# Patient Record
(demographics unavailable — no encounter records)

---

## 2024-11-03 NOTE — PHYSICAL EXAM
[NL] : moves all extremities x4, warm, well perfused x4 [FreeTextEntry7] : decreased BS left base. No r/r/w

## 2024-11-03 NOTE — HISTORY OF PRESENT ILLNESS
[de-identified] : fever [FreeTextEntry6] :  Pt with h/o fever x 1qd. Tm 102. Pt c/o abd pain. + c/c. No ear pulling. No V/D. Is eating less   Pt s/p OM, LT 1 mth ago

## 2024-11-03 NOTE — HISTORY OF PRESENT ILLNESS
[de-identified] : fever [FreeTextEntry6] :  Pt with h/o fever x 1qd. Tm 102. Pt c/o abd pain. + c/c. No ear pulling. No V/D. Is eating less   Pt s/p OM, LT 1 mth ago

## 2024-11-05 NOTE — REVIEW OF SYSTEMS
[Eye Discharge] : no eye discharge [Eye Redness] : no eye redness [Ear Tugging] : no ear tugging [Nasal Discharge] : nasal discharge [Nasal Congestion] : nasal congestion [Sore Throat] : no sore throat [Tachypnea] : not tachypneic [Wheezing] : no wheezing [Cough] : cough [Appetite Changes] : appetite changes [Intolerance to feeds] : tolerance to feeds [Diarrhea] : diarrhea [Vomiting] : no vomiting [Constipation] : no constipation [Abdominal Pain] : abdominal pain [Negative] : Genitourinary

## 2024-11-05 NOTE — DISCUSSION/SUMMARY
[FreeTextEntry1] : Anticipatory guidance and parent education given. Continue Azithromycin as prescribed for clinical pneumonia. Add probiotic daily. Increase fluid intake.  Supportive care. Follow up as needed for persistent or worsening symptoms.

## 2024-11-05 NOTE — PHYSICAL EXAM
[Acute Distress] : no acute distress [Tired appearing] : tired appearing [Mucoid Discharge] : mucoid discharge [NL] : warm, clear [FreeTextEntry4] : nasal congestion

## 2024-11-05 NOTE — HISTORY OF PRESENT ILLNESS
[de-identified] : loose BMs, stomach pain [FreeTextEntry6] : 22 month old girl BIB mother with c/o loose stool and intermittent abdominal pain since starting Azithromycin 2 days ago for clinical pneumonia. No blood or mucus in stool. No vomiting. No urinary complaints. Initially with fever at onset of sx 4 days ago, now resolved. Tolerating po but with decreased appetite. Normal sleep.

## 2024-11-05 NOTE — BEGINNING OF VISIT
[Mother] : mother
Tenzin Lazaro)  Neurosurgery  [address], [place] [address]  Phone: [phone]  Fax: [phone]  Follow Up Time:

## 2024-11-19 NOTE — HISTORY OF PRESENT ILLNESS
[de-identified] : cold sx, tugging ears [FreeTextEntry6] : 22 month old girl BIB father with c/o increased cough, congestion and URI sx over the past 1-2 days. Tugging at ears a lot and fussier with naps today. No fever. No SOB, difficulty breathing, stridor or wheeze. No v/d. No abdominal pain, sore throat or rash. No fatigue. No urinary complaints. Good po/uop/bm. Normal sleep and activity.

## 2024-11-19 NOTE — REVIEW OF SYSTEMS
[Fever] : no fever [Fussy] : fussy [Eye Discharge] : no eye discharge [Eye Redness] : no eye redness [Ear Tugging] : ear tugging [Nasal Discharge] : nasal discharge [Nasal Congestion] : nasal congestion [Sore Throat] : no sore throat [Tachypnea] : not tachypneic [Wheezing] : no wheezing [Cough] : cough [Negative] : Genitourinary

## 2024-12-14 NOTE — DISCUSSION/SUMMARY
[FreeTextEntry1] : 23 M with viral infection, given description concern for croup. Discussed with parent croup including expected natural course. Reviewed stridor and recommendation to expose child to cool air. If stridor persistent, seek care urgently. Also seek care if with increased work of breathing, rate of breathing, or other concerns.    Supportive care: cool mist humidifier, honey for cough suppression, warm liquids to break up mucus  Nasal saline for congestion Elevate head of bed Appropriate anticipatory guidance and education given; seek care if symptoms persist or worsen, signs of distress (reviewed w/parent signs of resp distress)

## 2024-12-14 NOTE — REVIEW OF SYSTEMS
[Difficulty with Sleep] : difficulty with sleep [Nasal Congestion] : nasal congestion [Cough] : cough [Negative] : Genitourinary

## 2024-12-14 NOTE — PHYSICAL EXAM
[Stridor] : no stridor [Mucoid Discharge] : no mucoid discharge [Erythematous Oropharynx] : erythematous oropharynx [Wheezing] : no wheezing [Tachypnea] : no tachypnea [Subcostal Retractions] : no subcostal retractions [Suprasternal Retractions] : no suprasternal retractions [NL] : warm, clear [FreeTextEntry7] : RR 32, no coughing or stridor during exam

## 2024-12-14 NOTE — HISTORY OF PRESENT ILLNESS
[FreeTextEntry6] : 23 M BIB mother for concerns of cough last night. Parent reports pt woke up with barky cough, questionable stridor. Gave saline with no significant improvement but tachypnea and stridor resolved. No hx of albuterol use. + Congestion. Reports tolerating PO, no emesis, normal UOP, normal bowel movements. Mother also with cold symptoms.

## 2024-12-31 NOTE — HISTORY OF PRESENT ILLNESS
[Parents] : parents [Fruit] : fruit [Vegetables] : vegetables [Meat] : meat [Cereal] : cereal [Eggs] : eggs [Finger Foods] : finger foods [Table food] : table food [Dairy] : dairy [Normal] : Normal [Sippy cup use] : Sippy cup use [Brushing teeth] : Brushing teeth [Vitamin] : Primary Fluoride Source: Vitamin [Playtime 60 min a day] : Playtime 60 min a day [Temper Tantrums] : Temper Tantrums [Toilet Training] : Toilet training [<2 hrs of screen time] : Less than 2 hrs of screen time [No] : No cigarette smoke exposure [Water heater temperature set at <120 degrees F] : Water heater temperature set at <120 degrees F [Smoke Detectors] : Smoke detectors [Car seat in back seat] : Car seat in back seat [Carbon Monoxide Detectors] : Carbon monoxide detectors [Up to date] : Up to date [At risk for exposure to TB] : Not at risk for exposure to Tuberculosis [FreeTextEntry7] : Doing well [de-identified] : None [FreeTextEntry1] : 2 year old girl here for routine PE. Doing well. No current concerns. Good po/uop/bm. Normal sleep and activity. Many words, short phrases, understands all. Jumps, climbs stairs, pretend play. Growth and development wnl. H/O eczema, under good control lately.

## 2024-12-31 NOTE — DISCUSSION/SUMMARY
[Normal Growth] : growth [Normal Development] : development [None] : No known medical problems [No Elimination Concerns] : elimination [No Feeding Concerns] : feeding [No Skin Concerns] : skin [Normal Sleep Pattern] : sleep [Assessment of Language Development] : assessment of language development [Temperament and Behavior] : temperament and behavior [Toilet Training] : toilet training [TV Viewing] : tv viewing [Safety] : safety [No Medications] : ~He/She~ is not on any medications [Parent/Guardian] : parent/guardian [] : The components of the vaccine(s) to be administered today are listed in the plan of care. The disease(s) for which the vaccine(s) are intended to prevent and the risks have been discussed with the caretaker.  The risks are also included in the appropriate vaccination information statements which have been provided to the patient's caregiver.  The caregiver has given consent to vaccinate. [FreeTextEntry1] : Anticipatory guidance and parent education given. Continue cow's milk. Continue table foods, 3 meals with 2-3 snacks per day. Incorporate water daily in a sippy cup. Brush teeth twice a day with soft toothbrush. Recommend visit to dentist. Fluoride daily. When in car, keep child in rear-facing car seats until age 2, or until  the maximum height and weight for seat is reached. Put toddler to sleep in own bed. Help toddler to maintain consistent daily routines and sleep schedule. Toilet training discussed. Ensure home is safe. Use consistent, positive discipline. Read aloud to toddler. Limit screen time to no more than 2 hours per day. CBC, Lead, Ferritin ordered. Flu, Hepatitis A vaccines given. Return in 6 months for PE.

## 2024-12-31 NOTE — PHYSICAL EXAM

## 2025-05-28 NOTE — DISCUSSION/SUMMARY
[FreeTextEntry1] : Anticipatory guidance and parent education given. Symptoms likely due to viral illness. May use Tylenol or Ibuprofen as needed for fever or discomfort. Recommend supportive care including humidified air, increased fluids, rest. Return if symptoms worsen or persist. F/U in 48h if fever persists. Parent understands and agrees with plan.

## 2025-05-28 NOTE — HISTORY OF PRESENT ILLNESS
[de-identified] : fever, ER follow up [FreeTextEntry6] : 2y5m old girl BIB father with c/o fever as high as 105 for the past 2 days. Seen in [place] ER, no work up done. Pt is doing better, temp of 101.5 today. Pt is without other sx, looks well when fever is down. No known sick contacts. No SOB, difficulty breathing, chest pain, cough, congestion or URI sx. No n/v/d. No headache, abdominal pain, sore throat or rash. No body aches or fatigue. No urinary complaints. Good po/uop/bm. Normal sleep and activity.